# Patient Record
Sex: FEMALE | Race: WHITE | Employment: FULL TIME | URBAN - METROPOLITAN AREA
[De-identification: names, ages, dates, MRNs, and addresses within clinical notes are randomized per-mention and may not be internally consistent; named-entity substitution may affect disease eponyms.]

---

## 2020-07-24 ENCOUNTER — HOSPITAL ENCOUNTER (EMERGENCY)
Facility: CLINIC | Age: 25
Discharge: HOME | End: 2020-07-24
Attending: EMERGENCY MEDICINE | Admitting: EMERGENCY MEDICINE
Payer: OTHER MISCELLANEOUS

## 2020-07-24 VITALS
SYSTOLIC BLOOD PRESSURE: 129 MMHG | WEIGHT: 293 LBS | HEART RATE: 102 BPM | HEIGHT: 66 IN | TEMPERATURE: 97.6 F | OXYGEN SATURATION: 98 % | RESPIRATION RATE: 20 BRPM | DIASTOLIC BLOOD PRESSURE: 84 MMHG | BODY MASS INDEX: 47.09 KG/M2
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
## 2020-07-24 DIAGNOSIS — S61.011A LACERATION OF RIGHT THUMB WITHOUT FOREIGN BODY WITHOUT DAMAGE TO NAIL, INITIAL ENCOUNTER: ICD-10-CM

## 2020-07-24 PROCEDURE — 12001 RPR S/N/AX/GEN/TRNK 2.5CM/<: CPT

## 2020-07-24 PROCEDURE — 25000128 H RX IP 250 OP 636: Performed by: EMERGENCY MEDICINE

## 2020-07-24 PROCEDURE — 99283 EMERGENCY DEPT VISIT LOW MDM: CPT | Mod: 25

## 2020-07-24 PROCEDURE — 90471 IMMUNIZATION ADMIN: CPT

## 2020-07-24 PROCEDURE — 90715 TDAP VACCINE 7 YRS/> IM: CPT | Performed by: EMERGENCY MEDICINE

## 2020-07-24 RX ADMIN — CLOSTRIDIUM TETANI TOXOID ANTIGEN (FORMALDEHYDE INACTIVATED), CORYNEBACTERIUM DIPHTHERIAE TOXOID ANTIGEN (FORMALDEHYDE INACTIVATED), BORDETELLA PERTUSSIS TOXOID ANTIGEN (GLUTARALDEHYDE INACTIVATED), BORDETELLA PERTUSSIS FILAMENTOUS HEMAGGLUTININ ANTIGEN (FORMALDEHYDE INACTIVATED), BORDETELLA PERTUSSIS PERTACTIN ANTIGEN, AND BORDETELLA PERTUSSIS FIMBRIAE 2/3 ANTIGEN 0.5 ML: 5; 2; 2.5; 5; 3; 5 INJECTION, SUSPENSION INTRAMUSCULAR at 23:05

## 2020-07-24 ASSESSMENT — ENCOUNTER SYMPTOMS
WOUND: 1
NUMBNESS: 1

## 2020-07-24 ASSESSMENT — MIFFLIN-ST. JEOR: SCORE: 2145.22

## 2020-07-24 NOTE — ED AVS SNAPSHOT
Emergency Department  [address] [place] [address]  Phone:  [phone]  Fax:  [phone]                                    Amberly Chin   MRN: [id number]    Department:   Emergency Department   Date of Visit:  7/24/2020           After Visit Summary Signature Page    I have received my discharge instructions, and my questions have been answered. I have discussed any challenges I see with this plan with the nurse or doctor.    ..........................................................................................................................................  Patient/Patient Representative Signature      ..........................................................................................................................................  Patient Representative Print Name and Relationship to Patient    ..................................................               ................................................  Date                                   Time    ..........................................................................................................................................  Reviewed by Signature/Title    ...................................................              ..............................................  Date                                               Time          22EPIC Rev 08/18

## 2020-07-24 NOTE — LETTER
July 24, 2020      To Whom It May Concern:      Amberly Chin was seen in our Emergency Department today, 07/24/20.  I expect her condition to improve over the next 1-2 days.  She may return to work when improved.    Sincerely,        Vannesa Devine RN

## 2020-07-25 NOTE — ED NOTES
Bed: ED23  Expected date:   Expected time:   Means of arrival:   Comments:  T4 Closing @ 2300; 27/28 will remain open

## 2020-07-25 NOTE — DISCHARGE INSTRUCTIONS
Keep wound clean and dry. May shower in 24 hours and pat dry. Do not keep submerged.  Follow up with primary care with sutures/staples to be removed in 7 days.  Return with signs of infection such as redness, discharge, or fever >101F, or if you have any other new or concerning symptoms.  Tylenol or Motrin as needed for pain.

## 2020-07-25 NOTE — ED TRIAGE NOTES
Pt was at work and right thumb got caught in a machine and sustained a laceration. Pt arrived with thumb bandaged and bleeding controlled.

## 2020-07-25 NOTE — ED PROVIDER NOTES
"  History     Chief Complaint:  Laceration    The history is provided by the patient.      Amberly Chin is a right-handed 25 year old female who presents with a laceration to her right thumb. Just prior to arrival, at work, she was using a machine that cuts ribbon when her finger got caught in the machine and it cut her finger instead of the ribbon. She has some numbness but attributes this to the tightness of the bandage her coworker placed. Her last Td/Tdap is unknown. She denies other concerns.    Allergies:  Acrylic Polymer     Medications:    The patient is not currently taking any prescribed medications.    Past Medical History:    The patient is not currently taking any prescribed medications.    Past Surgical History:    History reviewed. No pertinent surgical history.    Family History:    History reviewed. No pertinent family history.     Social History:  Works at [employer]   Work-related injury    Review of Systems   Skin: Positive for wound.   Neurological: Positive for numbness.   All other systems reviewed and are negative.    Physical Exam     Patient Vitals for the past 24 hrs:   BP Temp Temp src Pulse Resp SpO2 Height Weight   07/24/20 2307 129/84 -- -- -- -- -- -- --   07/24/20 1954 (!) 168/80 97.6  F (36.4  C) Temporal 102 20 98 % 1.676 m (5' 6\") 138.3 kg (305 lb)       Physical Exam  General: Well-developed and well-nourished. Well appearing young  woman. Cooperative.  Head:  Atraumatic.  Eyes:  Conjunctivae, lids, and sclerae are normal.  Neck:  Supple. Normal range of motion.  Resp:  No respiratory distress.   GI:  Obese.    MS:  Normal ROM.   Skin:  Warm. Non-diaphoretic. No pallor. 1 cm curvilinear laceration on the fat pad of the right thumb.  Neuro:  Awake. A&Ox3. Normal strength.  Sensation diminished to light touch distal to laceration.  Psych: Normal mood and affect. Normal speech.  Vitals reviewed.    Emergency Department Course     Procedures:    Laceration " Repair        LACERATION:  A simple minimally contaminated 1 cm laceration.      LOCATION:  Right thumb      FUNCTION:  Distally circulation, motor, and tendon function are intact.      ANESTHESIA:  Local using 1% lidocaine without epinephrine total of 0.5 mLs      PREPARATION:  Irrigation and scrubbing with normal saline      DEBRIDEMENT:  No debridement      CLOSURE:  Wound was closed with One Layer.  Skin closed with 2 x 5.0 Ethylon using interrupted sutures.    Interventions:  2305: Adacel 0.5 mL IM    Emergency Department Course:  Past medical records, nursing notes, and vitals reviewed.  2225: I performed an exam of the patient and obtained history, as documented above.    2231: I numbed the patient's finger.     2256: I performed a laceration repair. See note above.    Findings and plan explained to the patient. Patient discharged home with instructions regarding supportive care, medications, and reasons to return. The importance of close follow-up was reviewed.     Impression & Plan      Medical Decision Making:  Amberly is a 25-year-old female who sustained a laceration while at work using a machine that cuts ribbon.  She does have some numbness but thinks this may be due to the bandage.  She denies other concerns and appears well on exam.  There is a curvilinear 1 cm laceration on the fat pad of the right thumb.  She does report some subjective decreased sensation distally but has excellent perfusion. There is no evidence of tendon injury as it involves the fat pad only.  The wound was cleaned and repaired as above and her tetanus was updated.  She tolerated this quite well.  I discussed wound care, suture removal, and return precautions and answered all her questions.  She verbalized understanding and is amenable to discharge.    Diagnosis:    ICD-10-CM    1. Laceration of right thumb without foreign body without damage to nail, initial encounter  S61.011A    2. Work related injury  Y99.0       Disposition:  discharged home    Quintin Diaz  7/24/2020    EMERGENCY DEPARTMENT  I, Quintin Emily, am serving as a scribe at 10:33 PM on 7/24/2020 to document services personally performed by Magali Cid MD based on my observations and the provider's statements to me.        Magali Cid MD  07/26/20 [id number]